# Patient Record
Sex: FEMALE | Race: WHITE | URBAN - METROPOLITAN AREA
[De-identification: names, ages, dates, MRNs, and addresses within clinical notes are randomized per-mention and may not be internally consistent; named-entity substitution may affect disease eponyms.]

---

## 2018-06-19 ENCOUNTER — HISTORICAL (OUTPATIENT)
Dept: ADMINISTRATIVE | Facility: HOSPITAL | Age: 53
End: 2018-06-19

## 2022-04-29 VITALS
WEIGHT: 170 LBS | BODY MASS INDEX: 27.32 KG/M2 | SYSTOLIC BLOOD PRESSURE: 150 MMHG | DIASTOLIC BLOOD PRESSURE: 93 MMHG | HEIGHT: 66 IN

## 2022-05-02 NOTE — HISTORICAL [facility]ER
This is a historical note converted from [place]. Formatting and pictures may have been removed.  Please reference [place] for original formatting and attached multimedia. Chief Complaint  left shoulder pain x 3 weeks-no injury  History of Present Illness  This 53-year-old comes in with numerous complaints.? She is complaining of left shoulder pain of several months duration.? She denies injury.? She gives a good description of bicipital and impingement tendinitis.? She is also complaining of bilateral fifth finger and fourth finger numbness worse on the left than the right.? She apparently has had a surgery on her right hand?and she feels that this problem was addressed.? She thinks her right hand symptoms are returning.? Her surgery was done by Dr. Jackson.? She denies any weakness?but persistent numbness.  Review of Systems  Constitutional: No fever, weakness, or fatigue.  Ear/Nose/Mouth/Throat: No nasal congestion or sore throat.  Respiratory: No shortness of breath or cough.  Cardiovascular: No chest pain, palpitations, or peripheral edema.  Gastrointestinal: No nausea, vomiting, or abdominal pain.  Genitourinary: No dysuria.  Musculoskeletal: See current complaints  Integumentary: Negative.  Physical Exam  Vitals & Measurements  HR:?96(Peripheral)? BP:?150/93?  HT:?168?cm? HT:?168?cm? WT:?77.11?kg? WT:?77.11?kg? BMI:?27.32?  Physical examination shows that the patient is exquisitely tender on her left supraspinatus insertion and biceps tendon.? She has positive impingement symptoms.? She has good strength in abduction and external rotation.? She has significant pain along her biceps tendon with any provocative testing.? She is motor and sensory intact.  ?  Examination of her upper extremity shows that she has a bilateral positive Phalens test for [place] canal entrapment.? She has a negative Tinels at the cubital tunnel.? She has a negative Tinels at the carpal tunnel.? She still has?good  strength but does  have some mild weakness in her intrinsics worse on the left than the right.  ?  AP axillary lateral Y view of the left shoulder shows that the patient?has?no significant glenohumeral arthritis.? The orientation of the Y view does not allow me to look at the undersurface of her acromion.? Axillary lateral appears to be normal.  Assessment/Plan  1.?Left shoulder pain  ? The findings were reviewed with the patient and she expressed understanding.? The patient opted for an injection in her left shoulder.? The patients?left subacromial space was injected with dexamethasone 1 mL under sterile conditions. ?The patient tolerated the injection without difficulty.? The patient will be sent for nerve conduction studies of both?upper extremities.? I will see her back after her?nerve conduction studies are completed. ?She is instructed to call if she has any problems prior to her next appointment.  Ordered:  dexamethasone, 4 mg, Intra-Articular, Once, first dose 06/19/18 16:00:00 CDT, stop date 06/19/18 16:00:00 CDT  asp/inj jnt/bursa, major [id number] PC, 06/19/18 15:55:00 CDT, LT, LGMD AMB - [facility] [place], Routine, 06/19/18 15:55:00 CDT  Clinic Follow up, *Est. 07/03/18 3:00:00 CDT, Order for future visit, Left shoulder pain  Biceps tendinitis on left  Entrapment of left ulnar nerve  Entrapment of right ulnar nerve, LGMD [facility] [place]  External Referral, Bilateral UE NCS/EMGs, Neurology, 06/19/18 15:55:00 CDT, Left shoulder pain  Biceps tendinitis on left  Entrapment of left ulnar nerve  Entrapment of right ulnar nerve  Office/Outpatient Visit Level 4 New [id number] PC, Left shoulder pain  Biceps tendinitis on left  Entrapment of left ulnar nerve  Entrapment of right ulnar nerve, LGMD AMB - [facility] [place], 06/19/18 15:55:00 CDT  XR Shoulder Left Minimum 2 Views, Routine, 06/19/18 15:29:00 CDT, Pain, None, Ambulatory, Rad Type, Left shoulder pain, Not Scheduled, 06/19/18 15:29:00 CDT  ?  2.?Biceps tendinitis on  left  Ordered:  dexamethasone, 4 mg, Intra-Articular, Once, first dose 06/19/18 16:00:00 CDT, stop date 06/19/18 16:00:00 CDT  asp/inj jnt/bursa, major [id number] PC, 06/19/18 15:55:00 CDT, LT, LGMD AMB - [facility] [place], Routine, 06/19/18 15:55:00 CDT  Clinic Follow up, *Est. 07/03/18 3:00:00 CDT, Order for future visit, Left shoulder pain  Biceps tendinitis on left  Entrapment of left ulnar nerve  Entrapment of right ulnar nerve, LGMD [facility] [place]  External Referral, Bilateral UE NCS/EMGs, Neurology, 06/19/18 15:55:00 CDT, Left shoulder pain  Biceps tendinitis on left  Entrapment of left ulnar nerve  Entrapment of right ulnar nerve  Office/Outpatient Visit Level 4 New [id number] PC, Left shoulder pain  Biceps tendinitis on left  Entrapment of left ulnar nerve  Entrapment of right ulnar nerve, LGMD AMB - [facility] [place], 06/19/18 15:55:00 CDT  ?  3.?Entrapment of left ulnar nerve  Ordered:  dexamethasone, 4 mg, Intra-Articular, Once, first dose 06/19/18 16:00:00 CDT, stop date 06/19/18 16:00:00 CDT  asp/inj jnt/bursa, major [id number] , 06/19/18 15:55:00 CDT, LT, LGMD AMB - [facility] [place], Routine, 06/19/18 15:55:00 CDT  Clinic Follow up, *Est. 07/03/18 3:00:00 CDT, Order for future visit, Left shoulder pain  Biceps tendinitis on left  Entrapment of left ulnar nerve  Entrapment of right ulnar nerve, LGMD [facility] [place]  External Referral, Bilateral UE NCS/EMGs, Neurology, 06/19/18 15:55:00 CDT, Left shoulder pain  Biceps tendinitis on left  Entrapment of left ulnar nerve  Entrapment of right ulnar nerve  Office/Outpatient Visit Level 4 New [id number] PC, Left shoulder pain  Biceps tendinitis on left  Entrapment of left ulnar nerve  Entrapment of right ulnar nerve, LGMD AMB - [facility] [place], 06/19/18 15:55:00 CDT  ?  4.?Entrapment of right ulnar nerve  Ordered:  dexamethasone, 4 mg, Intra-Articular, Once, first dose 06/19/18 16:00:00 CDT, stop date 06/19/18 16:00:00 CDT  asp/inj jnt/bursa, major [id number] , 06/19/18 15:55:00 CDT, LT,  [facility], Routine, 18 15:55:00 CDT  Clinic Follow up, *Est. 18 3:00:00 CDT, Order for future visit, Left shoulder pain  Biceps tendinitis on left  Entrapment of left ulnar nerve  Entrapment of right ulnar nerve, [facility]  External Referral, Bilateral UE NCS/EMGs, Neurology, 18 15:55:00 CDT, Left shoulder pain  Biceps tendinitis on left  Entrapment of left ulnar nerve  Entrapment of right ulnar nerve  Office/Outpatient Visit Level 4 New [id number] PC, Left shoulder pain  Biceps tendinitis on left  Entrapment of left ulnar nerve  Entrapment of right ulnar nerve, LG[facility], 18 15:55:00 CDT  ?   Problem List/Past Medical History  Ongoing  Biceps tendinitis on left  Entrapment of left ulnar nerve  Left shoulder pain  Historical  No qualifying data  Procedure/Surgical History   section, Knee, Tubal ligation, Wrist.  Medications  [place] Thyroid 15 Mg Tablet, 15 mg= 1 tab(s), Oral, Daily  [place] Thyroid 180 Mg Tablet, 180 mg= 1 tab(s), Oral, Daily  [place] Multivitamin, Daily  Claritin 10 mg oral tablet, 10 mg= 1 tab(s), Oral, Daily  dexamethasone, 4 mg, Intra-Articular, Once  estriol  Nature-Throid 32.5 Mg Tablet, 16.25 mg= 0.5 tab(s), Oral, Daily  progesterone 100 mg oral capsule, 200 mg= 2 cap(s), Oral, Once a day (at bedtime)  Allergies  No Known Medication Allergies  Social History  Alcohol  Current, 1-2 times per week, 2018  Substance Abuse  Never, 2018  Tobacco  Never smoker Use:., 2018  Family History  Diabetes mellitus type 2: Mother.  Hypertension.: Mother and Father.  Health Maintenance  Health Maintenance  ???Pending?(in the next year)  ??? ??Due?  ??? ? ? ?Alcohol Misuse Screening due??18??and every 1??year(s)  ??? ? ? ?Aspirin Therapy for CVD Prevention due??18??and every 1??year(s)  ??? ? ? ?Breast Cancer Screening due??18??Variable frequency  ??? ? ? ?Colorectal Screening due??18??Variable  frequency  ??? ? ? ?Diabetes Screening due??06/19/18??Variable frequency  ??? ? ? ?Lipid Screening due??06/19/18??Variable frequency  ??? ? ? ?Tetanus Vaccine due??06/19/18??and every 10??year(s)  ??? ??Due In Future?  ??? ? ? ?Influenza Vaccine not due until??10/02/18??and every 1??year(s)  ???Satisfied?(in the past 1 year)  ??? ??Satisfied?  ??? ? ? ?Blood Pressure Screening on??06/19/18.??Satisfied by Diane Ramirez.  ??? ? ? ?Body Mass Index Check on??06/19/18.??Satisfied by Diane Ramirez.  ??? ? ? ?Depression Screening on??06/19/18.??Satisfied by Diane Ramirez.  ??? ? ? ?Obesity Screening on??06/19/18.??Satisfied by Diane Ramirez.  ??? ? ? ?Tobacco Use Screening on??06/19/18.??Satisfied by Diane Ramirez.  ?  ?